# Patient Record
Sex: FEMALE | Race: BLACK OR AFRICAN AMERICAN | Employment: OTHER | URBAN - METROPOLITAN AREA
[De-identification: names, ages, dates, MRNs, and addresses within clinical notes are randomized per-mention and may not be internally consistent; named-entity substitution may affect disease eponyms.]

---

## 2022-04-25 ENCOUNTER — HOSPITAL ENCOUNTER (OUTPATIENT)
Age: 57
Setting detail: OUTPATIENT SURGERY
Discharge: HOME | End: 2022-04-25
Attending: INTERNAL MEDICINE | Admitting: INTERNAL MEDICINE
Payer: COMMERCIAL

## 2022-04-25 VITALS
TEMPERATURE: 97.8 F | HEIGHT: 67 IN | BODY MASS INDEX: 29.15 KG/M2 | DIASTOLIC BLOOD PRESSURE: 50 MMHG | HEART RATE: 70 BPM | WEIGHT: 185.7 LBS | OXYGEN SATURATION: 100 % | SYSTOLIC BLOOD PRESSURE: 121 MMHG | RESPIRATION RATE: 15 BRPM

## 2022-04-25 PROCEDURE — 88305 TISSUE EXAM BY PATHOLOGIST: CPT

## 2022-04-25 PROCEDURE — 76040000019: Performed by: INTERNAL MEDICINE

## 2022-04-25 PROCEDURE — 77030039961 HC KT CUST COLON BSC -D: Performed by: INTERNAL MEDICINE

## 2022-04-25 PROCEDURE — 77030040361 HC SLV COMPR DVT MDII -B: Performed by: INTERNAL MEDICINE

## 2022-04-25 PROCEDURE — 77030021593 HC FCPS BIOP ENDOSC BSC -A: Performed by: INTERNAL MEDICINE

## 2022-04-25 PROCEDURE — 74011250636 HC RX REV CODE- 250/636: Performed by: INTERNAL MEDICINE

## 2022-04-25 PROCEDURE — 2709999900 HC NON-CHARGEABLE SUPPLY: Performed by: INTERNAL MEDICINE

## 2022-04-25 RX ORDER — SODIUM CHLORIDE 0.9 % (FLUSH) 0.9 %
5-40 SYRINGE (ML) INJECTION AS NEEDED
Status: CANCELLED | OUTPATIENT
Start: 2022-04-25

## 2022-04-25 RX ORDER — DIPHENHYDRAMINE HYDROCHLORIDE 50 MG/ML
50 INJECTION, SOLUTION INTRAMUSCULAR; INTRAVENOUS ONCE
Status: CANCELLED | OUTPATIENT
Start: 2022-04-25 | End: 2022-04-25

## 2022-04-25 RX ORDER — EPINEPHRINE 0.1 MG/ML
1 INJECTION INTRACARDIAC; INTRAVENOUS
Status: CANCELLED | OUTPATIENT
Start: 2022-04-25 | End: 2022-04-26

## 2022-04-25 RX ORDER — DEXTROMETHORPHAN/PSEUDOEPHED 2.5-7.5/.8
1.2 DROPS ORAL
Status: CANCELLED | OUTPATIENT
Start: 2022-04-25

## 2022-04-25 RX ORDER — ATROPINE SULFATE 0.1 MG/ML
0.5 INJECTION INTRAVENOUS
Status: CANCELLED | OUTPATIENT
Start: 2022-04-25 | End: 2022-04-26

## 2022-04-25 RX ORDER — DICYCLOMINE HYDROCHLORIDE 20 MG/1
20 TABLET ORAL DAILY
COMMUNITY
Start: 2022-04-14

## 2022-04-25 RX ORDER — FLUMAZENIL 0.1 MG/ML
0.2 INJECTION INTRAVENOUS
Status: DISCONTINUED | OUTPATIENT
Start: 2022-04-25 | End: 2022-04-25 | Stop reason: HOSPADM

## 2022-04-25 RX ORDER — MIDAZOLAM HYDROCHLORIDE 1 MG/ML
.25-5 INJECTION, SOLUTION INTRAMUSCULAR; INTRAVENOUS
Status: DISCONTINUED | OUTPATIENT
Start: 2022-04-25 | End: 2022-04-25 | Stop reason: HOSPADM

## 2022-04-25 RX ORDER — NALOXONE HYDROCHLORIDE 0.4 MG/ML
0.4 INJECTION, SOLUTION INTRAMUSCULAR; INTRAVENOUS; SUBCUTANEOUS
Status: DISCONTINUED | OUTPATIENT
Start: 2022-04-25 | End: 2022-04-25 | Stop reason: HOSPADM

## 2022-04-25 RX ORDER — SODIUM CHLORIDE 0.9 % (FLUSH) 0.9 %
5-40 SYRINGE (ML) INJECTION EVERY 8 HOURS
Status: CANCELLED | OUTPATIENT
Start: 2022-04-25

## 2022-04-25 RX ORDER — FENTANYL CITRATE 50 UG/ML
100 INJECTION, SOLUTION INTRAMUSCULAR; INTRAVENOUS
Status: DISCONTINUED | OUTPATIENT
Start: 2022-04-25 | End: 2022-04-25 | Stop reason: HOSPADM

## 2022-04-25 RX ORDER — SODIUM CHLORIDE 9 MG/ML
1000 INJECTION, SOLUTION INTRAVENOUS CONTINUOUS
Status: DISCONTINUED | OUTPATIENT
Start: 2022-04-25 | End: 2022-04-25 | Stop reason: HOSPADM

## 2022-04-25 RX ADMIN — SODIUM CHLORIDE 1000 ML: 9 INJECTION, SOLUTION INTRAVENOUS at 12:36

## 2022-04-25 NOTE — H&P
Assessment/Plan  # Detail Type Description    1. Assessment Abdominal pain (R10.9). Impression Pt of Dr. Joe Katz, referred to GI for evaluation and treatment of generalized abdominal pain, and additional associated GI symptoms (as detailed below). _______________________________________  *Onset: December 2021  -Generalized midline abdominal pain x3 months, severe, burning. Pt states pain is mainly located at midline umbilicus, and epigastric region. Bending over and any pressure to abdomen exacerbates the pain (i.e. restrictive clothing, or car seat belts). She has been wearing Maternity pants to avoid unnecessary pressure d/t clothing.  -Began Gluten-free diet on 3/29/2022 (Symptoms began markedly improving). -Taking the Xanax to prevent her seizures improved her symptoms, pt is asking if this is related to GI spasms somehow.  _______________________________________  GI-Scope Procedures:  -Never had an EGD before.  -1x C-scope: Done 9/4/2015 by Dr. Susannah Rajput. Negative Exam - 10yr Recall.  _______________________________________  *ABDOMINAL/KUB 2 VIEWS done on 11/23/2021  -Indication:  Abdominal pain for 1 month. -Impression: No acute findings. Mild colonic stool burden.  _______________________________________  *ABDOMINAL ULTRASOUND done on 11/30/2021-  -INDICATION:  Abdominal pain    -IMPRESSION:  1. Indeterminate 1.5 x 2.0 x 2.0 cm region of apparent soft tissue echogenicity adjacent to the level of the pancreatic tail. This is indeterminate, and could represent soft tissue including a splenule or a solid pancreatic lesion, or could represent non-shadowing decompressed bowel. Other soft tissue abnormality is not excluded. Follow-up is recommended with pancreatic protocol CT of the abdomen for better characterization. 2. Several scattered small subcentimeter benign hepatic cysts. 3. No evidence of an acute process.   _______________________________________  *CT ABDOMEN WITH ORAL & W/O AND W/ IV CONTRAST (PANCREATIC MASS) done on 2021:  -HISTORY:  Constipation and abdominal pain. Follow-up soft tissue echogenicity adjacent to the pancreatic tail seen on ultrasound    -IMPRESSION:  1. No focal pancreatic or peripancreatic abnormal soft tissue mass appreciated. The   finding seen on ultrasound may have represented a non air-filled focal segment of bowel. 2. Multiple benign hepatic cysts. _______________________________________  BMI: 29.4 (Short Frame), BM: 1x daily. *PM/SH:  (: 3). \"Stomach Issues\" started with her last pregnancy - 24 years ago, Per Pt. No reported hx of abdominal surgeries, strokes, or CAD. Patient Plan -Reviewed Imaging studies listed above with patient, and provided her with printed copies of all Imaging Reports.  -Rx Sent: Dicyclomine 20mg    *EGD Plan: Will proceed with EGD with Dr. Corado Boris [facility]), to evaluate upper GI tract for abnormalities, evidence to explain symptoms, and Tam's epithelium with biopsies, polypectomy, or dilation as indicated. -Patient is advised not to miss any doses of seizure medication, she is to take all doses on time with small sips of clear liquid, including on the day of the procedure (Pt is aware and verbalized understanding). *EGD Risks:  Explained risks of procedure to include bleeding, infection, reaction to sedation, and perforation with possible need for admission to the hospital, and in the most extensive of  circumstances, the patient may require surgery. Pt verbalized understanding of these risks and is agreeable with this procedure. Plan Orders Further diagnostic evaluations ordered today include(s) UPPER GI ENDOSCOPY, DIAGNOSIS to be performed. 2. Assessment Abnormal weight loss (R63.4). Impression Unintentional weight loss of 7lbs (typically has trouble losing weight) - Minor loss. 3. Assessment Loss of appetite (R63.0). Impression Loss of appetite.  Only consuming a Boost drink during the day (which does not recreate symptoms - Not likely gallbladder etiology). She then eats small dinner once home from work in the evening, if anything is eaten during the day it is in smaller and more frequent quantities. She is uncertain if early satiety is present because of these conscious changes in dietary habits in an effort to help the nausea and pain. 4. Assessment Dyspepsia (R10.13). Impression Heartburn - Nexium 40mg QAM taken x2 weeks (No relief obtained) - Pt reports worsened symptoms of nausea, flu-like symptoms, and constipation experienced with taking the Nexium this time. Nexium in the past has helped with her intermittent dyspepsia and epigastric burning, but has not helped at all this time. -Miralax was not helpful at alleviating the constipation caused by the Nexium  -BRAT diet (slightly helped), she was missing work as a result of these symptoms. 5. Assessment Nausea (R11.0). Impression Nausea - Most bothersome symptom reported. Zofran taken x2 weeks (No relief obtained). 6. Assessment Flatulence (R14.3). Impression Flatulence - Improved since being on Gluten-free diet, and decreasing intake of fruits. Pt avoids beans and legumes as well. (Likely improved d/t decrease in FODMAP intake). 7. Assessment Seizure (R56.9). Impression Taking Lamictal & Xanax for Partial Seizure disorder. Last seizure: 6 months ago    *Followed by Porsha Conner @[facility] for this. Patient Plan -Patient is advised not to miss any doses of seizure medication, she is to take all doses on time with small sips of clear liquid, including on the day of the procedure (Pt is aware and verbalized understanding). 8. Assessment Family history of other diseases of the digestive system (Z83.79).     Impression *FHx of GI-related diseases:  -Mother: GERD, Gastroparesis, Colonic Adenomas  -MGM: Frequent dietary-related flatulence (?IBS)  Maternal Great Aunt: s/p Cholecystectomy. This 62year old  patient was referred by Anderson Macias. This 62year old female presents for Abdominal Pain. History of Present Illness  1. Abdominal Pain   Onset: 3 months ago. Severity is severe. Duration is varies. It occurs daily. The problem is improving. Location is midline. There is no radiation. The patient describes it as burning. Context: after meals, empty stomach and wakes from sleep. Symptom is aggravated by milk/dairy products, pressure to abdomen, bending over, food and blueberries. Relieving factors include Gluten free. Associated symptoms include change in appetite, flatulence, heartburn, nausea and weight loss. Pertinent negatives include back pain, bloating, blood in stool, constipation, diaphoresis, diarrhea, dizziness, dyspnea, eructation, fatigue, fever, flank pain, flushing, hematuria, jaundice, lightheadedness, menstruation, myalgia, rash, reflux, vaginal bleeding, vaginal discharge, vomiting and weight gain. Additional information: Last colonoscopy was 9/4/15, BM 1x daily. Problem List  Problem Description Onset Date Chronic Clinical Status Notes   Left upper quadrant pain 2015 N     Nausea present 2022 N     Appetite symptom 2022 N     Passing flatus 2022 N     Localized abdominal pain 2022 N     Seizure disorder 2022 N     Weight decreased 2022 N       Past Medical/Surgical History   (Detailed)  Disease/disorder Onset Date Management Date Comments   Seizure Disorder             Family History   (Detailed)    Relationship Family Member Name  Age at Death Condition Onset Age Cause of Death   Father  N  Coronary artery disease 48 N   Sister    Cancer, breast 39 N     Social History  (Detailed)  Tobacco use reviewed. Preferred language is [place]. Marital Status/Family/Social Support  Marital status:      Smoking status: Never smoker.     Tobacco Screening  Patient has never used tobacco. Patient has not used tobacco in the last 30 days. Patient has not used smokeless tobacco in the last 30 days. Smoking Status  Type Smoking Status Usage Per Day Years Used Pack Years Total Pack Years    Never smoker         Tobacco/Vaping Exposure  No passive smoke exposure. Alcohol  There is a history of alcohol use. consumed Occasional.    Caffeine  The patient does not use caffeine. Medications (active prior to today)  Medication Instructions Start Date Stop Date Refilled Elsewhere   Xanax 1 mg tablet take 1/2 tablet by oral route as needed //   Y   One-A-Day Women's 50 Plus 400 mcg-20 mcg tablet  //   Y   Lamictal 200 mg tablet take 1 Tablet by oral route  every day 09/30/2021   N   Nexium 40 mg capsule,delayed release take 1 capsule by oral route 2 times every day 11/23/2021   N     Patient Status   Completed with information received for patient in a summary of care record. Medication Reconciliation  Medications reconciled today. Medication Reviewed  Adherence Medication Name Sig Desc Elsewhere Status   taking as directed Xanax 1 mg tablet take 1/2 tablet by oral route as needed Y Verified   taking as directed Lamictal 200 mg tablet take 1 Tablet by oral route  every day N Verified   taking as directed One-A-Day Women's 50 Plus 400 mcg-20 mcg tablet  Y Verified   taking as directed Nexium 40 mg capsule,delayed release take 1 capsule by oral route 2 times every day N Verified     Medications (Added, Continued or Stopped today)  Start Date Medication Directions PRN Status PRN Reason Instruction Stop Date   04/07/2022 dicyclomine 20 mg tablet take 1 tablet by oral route 3 times every day before meals or PRN cramping.  N      09/30/2021 Lamictal 200 mg tablet take 1 Tablet by oral route  every day N      11/23/2021 Nexium 40 mg capsule,delayed release take 1 capsule by oral route 2 times every day N       One-A-Day Women's 50 Plus 400 mcg-20 mcg tablet  N       Xanax 1 mg tablet take 1/2 tablet by oral route as needed N        Allergies  Ingredient Reaction (Severity) Medication Name Comment   SULFA (SULFONAMIDE ANTIBIOTICS) Stomach Pain       Reviewed, no changes. Review of Systems  System Neg/Pos Details   Constitutional Positive Weight loss. Constitutional Negative Fatigue, Fever, Flushing and Weight gain. ENMT Negative Sinus Infection. Eyes Negative Double vision. Respiratory Negative Asthma, Chronic cough and Dyspnea. Cardio Negative Chest pain, Edema and Irregular heartbeat/palpitations. GI Positive Change in appetite, Flatulence, Heartburn, Nausea. GI Negative Abdominal pain, Bloating, Blood in stool, Change in bowel habits, Constipation, Decreased appetite, Diarrhea, Dysphagia, Eructation, Hematemesis, Hematochezia, Jaundice, Melena, Reflux and Vomiting.  Negative Back pain, Dysuria, Flank pain, Hematuria, Menstruation and Vaginal bleeding. Endocrine Negative Cold intolerance, Diaphoresis and Heat intolerance. Neuro Negative Dizziness, Headache, Lightheadedness, Numbness and Tremors. Psych Negative Anxiety, Depression and Increased stress. Integumentary Negative Hives, Pruritus and Rash. MS Negative Back pain, Joint pain and Myalgia. Hema/Lymph Negative Easy bleeding, Easy bruising and Lymphadenopathy. Allergic/Immuno Negative Food allergies and Immunosuppression. Reproductive Negative Vaginal discharge.        Vital Signs   Height  Time ft in cm Last Measured Height Position   10:59 AM 5.0 7.00 170.18 04/07/2022 Standing     Weight/BSA/BMI  Time lb oz kg Context BMI kg/m2 BSA m2   10:59 .00  85.275 dressed with shoes 29.44 2.01     Date/Time Temp Pulse BP Arterial Line 1 BP (mmHg) BP Patient Position Resp SpO2 O2 Device O2 Flow Rate (L/min) Level of Consciousness MEWS Score Weight   04/25/22 1218 99.3 °F (37.4 °C) 77 124/71   16 98 % None (Room air)  Alert (0) 1 84.2 kg (185 lb 11.2 oz)     Physical  Exam  Exam Findings Details   Constitutional Normal Well developed. Eyes Normal Conjunctiva - Right: Normal, Left: Normal. Sclera - Right: Normal, Left: Normal.   Nasopharynx Normal Lips/teeth/gums - Normal.   Neck Exam Normal Inspection - Normal.   Respiratory Normal Inspection - Normal.   Cardiovascular Normal Regular rate and rhythm. No murmurs, gallops, or rubs. Vascular Normal Pulses - Brachial: Normal.   Skin Normal Inspection - Normal.   Musculoskeletal Normal Hands/Wrist - Right: Normal, Left: Normal.   Extremity Normal No edema. Neurological Normal Fine motor skills - Normal.   Psychiatric Normal Orientation - Oriented to time, place, person & situation. Appropriate mood and affect. Patient Education  # Patient Education   1.  Abdominal Pain: Care Instructions         No change in H&P

## 2022-04-25 NOTE — DISCHARGE INSTRUCTIONS
Denise Kennedy   DISCHARGE SUMMARY from Nurse    PATIENT INSTRUCTIONS:    After general anesthesia or intravenous sedation, for 24 hours or while taking prescription Narcotics:  · Limit your activities  · Do not drive and operate hazardous machinery  · Do not make important personal or business decisions  · Do  not drink alcoholic beverages  · If you have not urinated within 8 hours after discharge, please contact your surgeon on call. Report the following to your surgeon:  · Excessive pain, swelling, redness or odor of or around the surgical area  · Temperature over 100.5  · Nausea and vomiting lasting longer than 4 hours or if unable to take medications  · Any signs of decreased circulation or nerve impairment to extremity: change in color, persistent  numbness, tingling, coldness or increase pain  · Any questions    What to do at Home:  Recommended activity: as above ,    If you experience any of the following symptoms as above , please follow up with Doctor Reilly gonzalez. *  Please give a list of your current medications to your Primary Care Provider. *  Please update this list whenever your medications are discontinued, doses are      changed, or new medications (including over-the-counter products) are added. *  Please carry medication information at all times in case of emergency situations. These are general instructions for a healthy lifestyle:    No smoking/ No tobacco products/ Avoid exposure to second hand smoke  Surgeon General's Warning:  Quitting smoking now greatly reduces serious risk to your health.     Obesity, smoking, and sedentary lifestyle greatly increases your risk for illness    A healthy diet, regular physical exercise & weight monitoring are important for maintaining a healthy lifestyle    You may be retaining fluid if you have a history of heart failure or if you experience any of the following symptoms:  Weight gain of 3 pounds or more overnight or 5 pounds in a week, increased swelling in our hands or feet or shortness of breath while lying flat in bed. Please call your doctor as soon as you notice any of these symptoms; do not wait until your next office visit. The discharge information has been reviewed with the patient and spouse. The patient and spouse verbalized understanding. Discharge medications reviewed with the patient and spouse and appropriate educational materials and side effects teaching were provided. ___________________________________________________________________________________________________________________________________  [id number]  1965    EGD DISCHARGE INSTRUCTIONS  Discomfort:  Sore throat- throat lozenges or warm salt water gargle  redness at IV site- apply warm compress to area; if redness or soreness persist- contact your physician  Gaseous discomfort- walking, belching will help relieve any discomfort  You may not operate a vehicle until the next day  You may not engage in an occupation involving machinery or appliances until the next day  You may not drink alcoholic beverages until the next day  Avoid making any critical decisions for at least 24 hour    DIET   You may not resume your regular diet. Antireflux diet. ACTIVITY  You may not resume your normal daily activities   Spend the remainder of the day resting -  avoid any strenuous activity. CALL M.D. ANY SIGN OF   Increasing pain, nausea, vomiting  Abdominal distension (swelling)  New increased bleeding (oral or rectal)  Fever (chills)  Pain in chest area  Bloody discharge from nose or mouth  Shortness of breath     You may not take any Advil, Aspirin, Ibuprofen, Motrin, Aleve, or Goodys preferably ONLY  Tylenol as needed for pain. Follow-up Instructions: Follow-up in the office as scheduled or make a follow-up appointment in 2 weeks.      Valerie Munoz MD  April 25, 2022    Patient armband removed and shredded

## 2022-04-25 NOTE — PROCEDURES
(EGD) Esophagogastroduodenoscopy (UPPER ENDOSCOPY) Procedure Note  [facility] [place]  __________________________________________________________________________________________________________________________      4/25/2022     Patient: Guillermo Trammell YOB: 1965 Gender: female Age: 62 y.o. INDICATION:     Pt of Dr. Cheryl Bhatt, referred to GI for evaluation and treatment of generalized epigastric abdominal pain, and nausea after eating  *Onset: December 2021  -Generalized midline abdominal pain x3 months, severe, burning. Pt states pain is mainly located at midline umbilicus, and epigastric region. Bending over and any pressure to abdomen exacerbates the pain (i.e. restrictive clothing, or car seat belts). She has been wearing Maternity pants to avoid unnecessary pressure d/t clothing.  -Began Gluten-free diet on 3/29/2022 (Symptoms began markedly improving). -Taking the Xanax to prevent her seizures improved her symptoms, pt is asking if this is related to GI spasms somehow.  -Never had an EGD before.  -1x C-scope: Done 9/4/2015 by Dr. Dayday Villeda. Negative Exam - 10yr Recall. *ABDOMINAL/KUB 2 VIEWS done on 11/23/2021  -Indication:  Abdominal pain for 1 month. -Impression: No acute findings. Mild colonic stool burden. *ABDOMINAL ULTRASOUND done on 11/30/2021-  -INDICATION:  Abdominal pain  -IMPRESSION:1. Indeterminate 1.5 x 2.0 x 2.0 cm region of apparent soft tissue echogenicity adjacent to the level of the pancreatic tail. This is indeterminate, and could represent soft tissue including a splenule or a solid pancreatic lesion, or could represent non-shadowing decompressed bowel. Other soft tissue abnormality is not excluded. Follow-up is recommended with pancreatic protocol CT of the abdomen for better characterization. 2. Several scattered small subcentimeter benign hepatic cysts. 3. No evidence of an acute process.   CT ABDOMEN WITH ORAL & W/O AND W/ IV CONTRAST (PANCREATIC MASS) done on 2021:  -HISTORY:  Constipation and abdominal pain. Follow-up soft tissue echogenicity adjacent to the pancreatic tail seen on ultrasound   1. No focal pancreatic or peripancreatic abnormal soft tissue mass appreciated. The   finding seen on ultrasound may have represented a non air-filled focal segment of bowel. 2. Multiple benign hepatic cysts. BMI: 29.4 (Short Frame), BM: 1x daily. *PM/SH:  (: 3). \"Stomach Issues\" started with her last pregnancy - 24 years ago,         : Sreekanth Gandhi MD    Referring Provider:  Hemal Bailey MD    Sedation:  Versed 8 mg IV, Fentanyl 100 mcg IV    Procedure Details:  After infomed consent was obtained for the procedure, with all risks and benefits of procedure explained to the family the patient was taken to the endoscopy suite and placed in the left lateral decubitus position. Following sequential administration of sedation as per above, the endoscope was inserted into the mouth and advanced under direct vision to third portion of the duodenum. A careful inspection was made as the gastroscope was withdrawn, including a retroflexed view of the proximal stomach; findings and interventions are described below. OROPHARYNX: The vocal Cords and the larynx are normal.   ESOPHAGUS: The proximal, mid, and distal oesophagus are normal. The Z-Line is intact. No Hiatal hernia . Diaphragmatic opening or notch is located at 43 cm. STOMACH: No evidence of blood, fluid or solid food retention. The fundus on antegrade and retroflex views is normal. Beside a couple of small benign gastric polyps in the proximal stomach 4 mm, The cardia, body, lesser curvature, greater curvature, the antrum, and pylorus are normal. The gastric mucosa is normal. 5 gastric biopsies are taken   DUODENUM: The bulb, second, third portions and major papilla are normal.  PROXIMAL JEJUNUM:  Not examined.   Therapies:  Gastric biopsies x 5    Specimen: none Complications:   None    EBL:  Negligible. IMPLANTS: * No implants in log *  IMPRESSION: Beside a couple of small benign gastric polyps in the proximal stomach 4 mm, The cardia, body, lesser curvature, greater curvature, the antrum, and pylorus are normal. The gastric mucosa is normal. 5 gastric biopsies          RECOMMENDATION:  May resume antireflux diet. Avoid NSAID's. Make a FU appointment at the office. May have to do gastric empty study.   Assistant: None    --Maritza Conner MD on 4/25/2022 at 1:34 PM

## 2022-04-25 NOTE — PERIOP NOTES
Face to face  Discharged  instruction given Lalito/janey  agreed with the plan. Discharged includes diet- anti rreflux, activity limitations , medication to continue and f/u appointment with Doctor Cherie Rosenbaum. D/c to home in stable condition with care of family.

## 2022-05-04 ENCOUNTER — TRANSCRIBE ORDER (OUTPATIENT)
Dept: SCHEDULING | Age: 57
End: 2022-05-04

## 2022-05-04 DIAGNOSIS — R10.13 ABDOMINAL PAIN, EPIGASTRIC: Primary | ICD-10-CM

## 2022-05-19 ENCOUNTER — HOSPITAL ENCOUNTER (OUTPATIENT)
Dept: NUCLEAR MEDICINE | Age: 57
Discharge: HOME | End: 2022-05-19
Attending: INTERNAL MEDICINE
Payer: COMMERCIAL

## 2022-05-19 DIAGNOSIS — R10.13 ABDOMINAL PAIN, EPIGASTRIC: ICD-10-CM

## 2022-05-19 PROCEDURE — 78264 GASTRIC EMPTYING IMG STUDY: CPT

## 2022-05-19 RX ORDER — TECHNETIUM TC 99M SULFUR COLLOID 2 MG
1 KIT MISCELLANEOUS
Status: COMPLETED | OUTPATIENT
Start: 2022-05-19 | End: 2022-05-19

## 2022-05-19 RX ADMIN — TECHNETIUM TC 99M SULFUR COLLOID 1 MILLICURIE: KIT at 08:00

## 2022-07-21 ENCOUNTER — TRANSCRIBE ORDER (OUTPATIENT)
Dept: SCHEDULING | Age: 57
End: 2022-07-21

## 2022-07-21 DIAGNOSIS — R10.13 DYSPEPSIA: Primary | ICD-10-CM

## 2022-08-08 ENCOUNTER — HOSPITAL ENCOUNTER (OUTPATIENT)
Dept: NUCLEAR MEDICINE | Age: 57
Discharge: HOME | End: 2022-08-08
Attending: NURSE PRACTITIONER
Payer: COMMERCIAL

## 2022-08-08 DIAGNOSIS — R10.13 DYSPEPSIA: ICD-10-CM

## 2022-08-08 PROCEDURE — 78226 HEPATOBILIARY SYSTEM IMAGING: CPT

## 2022-08-08 RX ORDER — KIT FOR THE PREPARATION OF TECHNETIUM TC 99M MEBROFENIN 45 MG/10ML
6.6 INJECTION, POWDER, LYOPHILIZED, FOR SOLUTION INTRAVENOUS
Status: COMPLETED | OUTPATIENT
Start: 2022-08-08 | End: 2022-08-08

## 2022-08-08 RX ADMIN — KIT FOR THE PREPARATION OF TECHNETIUM TC 99M MEBROFENIN 6.6 MILLICURIE: 45 INJECTION, POWDER, LYOPHILIZED, FOR SOLUTION INTRAVENOUS at 08:08

## 2024-07-29 ENCOUNTER — HOSPITAL ENCOUNTER (OUTPATIENT)
Facility: HOSPITAL | Age: 59
Discharge: HOME | End: 2024-08-01
Attending: SURGERY
Payer: COMMERCIAL

## 2024-07-29 VITALS — WEIGHT: 185 LBS | BODY MASS INDEX: 28.98 KG/M2

## 2024-07-29 DIAGNOSIS — K82.8 OTHER SPECIFIED DISEASES OF GALLBLADDER: ICD-10-CM

## 2024-07-29 PROCEDURE — 6360000004 HC RX CONTRAST MEDICATION: Performed by: SURGERY

## 2024-07-29 PROCEDURE — A9537 TC99M MEBROFENIN: HCPCS | Performed by: SURGERY

## 2024-07-29 PROCEDURE — 3430000000 HC RX DIAGNOSTIC RADIOPHARMACEUTICAL: Performed by: SURGERY

## 2024-07-29 PROCEDURE — 2580000003 HC RX 258: Performed by: SURGERY

## 2024-07-29 RX ORDER — KIT FOR THE PREPARATION OF TECHNETIUM TC 99M MEBROFENIN 45 MG/10ML
6.8 INJECTION, POWDER, LYOPHILIZED, FOR SOLUTION INTRAVENOUS
Status: COMPLETED | OUTPATIENT
Start: 2024-07-29 | End: 2024-07-29

## 2024-07-29 RX ADMIN — KIT FOR THE PREPARATION OF TECHNETIUM TC 99M MEBROFENIN 6.8 MILLICURIE: 45 INJECTION, POWDER, LYOPHILIZED, FOR SOLUTION INTRAVENOUS at 07:52

## 2024-07-29 RX ADMIN — SINCALIDE 1.68 MCG: 5 INJECTION, POWDER, LYOPHILIZED, FOR SOLUTION INTRAVENOUS at 08:14

## (undated) DEVICE — SPONGE GZ W4XL4IN COT 12 PLY TYP VII WVN C FLD DSGN

## (undated) DEVICE — MOUTHPIECE ENDOSCP 20X27MM --

## (undated) DEVICE — TUBING, SUCTION, 1/4" X 12', STRAIGHT: Brand: MEDLINE

## (undated) DEVICE — SINGLE PORT MANIFOLD: Brand: NEPTUNE 2

## (undated) DEVICE — REM POLYHESIVE ADULT PATIENT RETURN ELECTRODE: Brand: VALLEYLAB

## (undated) DEVICE — MAJ-1414 SINGLE USE ADPATER BIOPSY VALV: Brand: SINGLE USE ADAPTOR BIOPSY VALVE

## (undated) DEVICE — FORCEPS BX CAP 240CM L RAD JAW 4

## (undated) DEVICE — TRAP SPEC COLL POLYP POLYSTYR --

## (undated) DEVICE — GARMENT,MEDLINE,DVT,INT,CALF,MED, GEN2: Brand: MEDLINE

## (undated) DEVICE — KENDALL RADIOLUCENT FOAM MONITORING ELECTRODE RECTANGULAR SHAPE: Brand: KENDALL

## (undated) DEVICE — Device